# Patient Record
Sex: FEMALE | Race: WHITE | Employment: OTHER | URBAN - METROPOLITAN AREA
[De-identification: names, ages, dates, MRNs, and addresses within clinical notes are randomized per-mention and may not be internally consistent; named-entity substitution may affect disease eponyms.]

---

## 2017-01-12 ENCOUNTER — TELEPHONE (OUTPATIENT)
Dept: CARDIOLOGY CLINIC | Age: 82
End: 2017-01-12

## 2017-01-12 ENCOUNTER — OFFICE VISIT (OUTPATIENT)
Dept: CARDIOLOGY CLINIC | Age: 82
End: 2017-01-12

## 2017-01-12 VITALS
HEART RATE: 71 BPM | BODY MASS INDEX: 26.72 KG/M2 | SYSTOLIC BLOOD PRESSURE: 140 MMHG | DIASTOLIC BLOOD PRESSURE: 64 MMHG | OXYGEN SATURATION: 96 % | WEIGHT: 136.8 LBS

## 2017-01-12 DIAGNOSIS — Z79.01 CHRONIC ANTICOAGULATION: ICD-10-CM

## 2017-01-12 DIAGNOSIS — I44.7 LEFT BUNDLE BRANCH BLOCK: ICD-10-CM

## 2017-01-12 DIAGNOSIS — E03.4 HYPOTHYROIDISM DUE TO ACQUIRED ATROPHY OF THYROID: ICD-10-CM

## 2017-01-12 DIAGNOSIS — I49.5 TACHY-BRADY SYNDROME (HCC): ICD-10-CM

## 2017-01-12 DIAGNOSIS — I48.20 CHRONIC ATRIAL FIBRILLATION (HCC): Primary | ICD-10-CM

## 2017-01-12 DIAGNOSIS — R06.09 DOE (DYSPNEA ON EXERTION): ICD-10-CM

## 2017-01-12 RX ORDER — LEVOTHYROXINE SODIUM 25 UG/1
25 TABLET ORAL
COMMUNITY

## 2017-01-12 NOTE — TELEPHONE ENCOUNTER
Results explained to patient, she scheduled today with Dr. Aileen Brooks, will discuss scheduling with Dr. Hadley Sandhu at that time per patient's request.

## 2017-01-12 NOTE — PROGRESS NOTES
1. Have you been to the ER, urgent care clinic since your last visit? Hospitalized since your last visit? No    2. Have you seen or consulted any other health care providers outside of the [address] [place] [address] since your last visit? Include any pap smears or colon screening.  No

## 2017-01-12 NOTE — MR AVS SNAPSHOT
Visit Information Date & Time Provider Department Dept. Phone Encounter #  
 1/12/2017 10:00 AM Shar Andrews MD [facility] [address] [id number] Your Appointments 2/2/2017 12:40 PM  
DISCUSSION with Tavon Garza MD  
[facility] ([facility]) [place] [address] [address] [address] [phone] [address] [place] [address] [address] Upcoming Health Maintenance Date Due DTaP/Tdap/Td series (1 - Tdap) 10/22/1944 ZOSTER VACCINE AGE 60> 10/22/1983 GLAUCOMA SCREENING Q2Y 10/22/1988 OSTEOPOROSIS SCREENING (DEXA) 10/22/1988 Pneumococcal 65+ Low/Medium Risk (1 of 2 - PCV13) 10/22/1988 MEDICARE YEARLY EXAM 10/22/1988 INFLUENZA AGE 9 TO ADULT 8/1/2016 Allergies as of 1/12/2017  Review Complete On: 12/15/2016 By: Shar Andrews MD  
  
 Severity Noted Reaction Type Reactions Codeine  06/30/2013    Other (comments) Current Immunizations  Never Reviewed No immunizations on file. Not reviewed this visit You Were Diagnosed With   
  
 Codes Comments Irregular heartbeat    -  Primary ICD-10-CM: I49.9 ICD-9-CM: 427.9 Vitals BP Pulse Weight(growth percentile) SpO2 BMI OB Status 140/64 71 136 lb 12.8 oz (62.1 kg) 96% 26.72 kg/m2 Postmenopausal  
 Smoking Status Never Smoker Vitals History BMI and BSA Data Body Mass Index Body Surface Area  
 26.72 kg/m 2 1.62 m 2 Preferred Pharmacy Pharmacy Name Phone Tomas Guillen Do [facility] [address], [address] [facility] [phone] Your Updated Medication List  
  
   
This list is accurate as of: 1/12/17 11:51 AM.  Always use your most recent med list.  
  
  
  
  
 ALPHAGAN P 0.1 % ophthalmic solution Generic drug:  brimonidine Apply  to eye. amLODIPine 2.5 mg tablet Commonly known as:  Stacy Darting Take  by mouth.  
  
 calcium carbonate 500 mg calcium (1,250 mg) tablet Commonly known as:  OS-MARLON Take  by mouth. CENTRUM SILVER 0.4-300-250 mg-mcg-mcg Tab Generic drug:  multivit-min-FA-lycopen-lutein Take  by mouth. COSOPT 22.3-6.8 mg/mL ophthalmic solution Generic drug:  dorzolamide-timolol Apply  to eye.  
  
 levothyroxine 25 mcg tablet Commonly known as:  SYNTHROID Take 25 mcg by mouth Daily (before breakfast). losartan-hydroCHLOROthiazide 100-25 mg per tablet Commonly known as:  HYZAAR Take  by mouth.  
  
 metoprolol succinate 50 mg XL tablet Commonly known as:  TOPROL-XL Take  by mouth. PEPCID 20 mg tablet Generic drug:  famotidine Take  by mouth.  
  
 triamcinolone acetonide 0.1 % ointment Commonly known as:  KENALOG Triamcinolone Acetonide 0.1 % External Ointment  Quantity: 15   Started 7-Oct-2016 Active TYLENOL ARTHRITIS PAIN 650 mg CR tablet Generic drug:  acetaminophen Take  by mouth. Valentino Grew Walker Miscellaneous One walker--PT evaluate if wheels needed  Quantity: 1    [facility]  Started 26-Jan-2015 Active XALATAN 0.005 % ophthalmic solution Generic drug:  latanoprost  
Apply  to eye. * XARELTO 20 mg Tab tablet Generic drug:  rivaroxaban Take 20 mg by mouth daily. * rivaroxaban 15 mg Tab tablet Commonly known as:  Tennille Johnson Take 1 Tab by mouth daily. * Notice: This list has 2 medication(s) that are the same as other medications prescribed for you. Read the directions carefully, and ask your doctor or other care provider to review them with you. We Performed the Following AMB POC EKG ROUTINE W/ 12 LEADS, INTER & REP [31354 CPT(R)] Introducing [facility] & HEALTH SERVICES! [address] introduces [employer] patient portal. Now you can access parts of your medical record, email your doctor's office, and request medication refills online. 1. In your internet browser, go to [email or web address] 2. Click on the First Time User? Click Here link in the Sign In box. You will see the New Member Sign Up page. 3. Enter your [employer] Access Code exactly as it appears below. You will not need to use this code after youve completed the sign-up process. If you do not sign up before the expiration date, you must request a new code. · [employer] Access Code: [email or web address] Expires: 3/15/2017 10:07 AM 
 
4. Enter the last four digits of your Social Security Number (xxxx) and Date of Birth (mm/dd/yyyy) as indicated and click Submit. You will be taken to the next sign-up page. 5. Create a MobileSnackt ID. This will be your [employer] login ID and cannot be changed, so think of one that is secure and easy to remember. 6. Create a [employer] password. You can change your password at any time. 7. Enter your Password Reset Question and Answer. This can be used at a later time if you forget your password. 8. Enter your e-mail address. You will receive e-mail notification when new information is available in [address] 9. Click Sign Up. You can now view and download portions of your medical record. 10. Click the Download Summary menu link to download a portable copy of your medical information. If you have questions, please visit the Frequently Asked Questions section of the [employer] website. Remember, [employer] is NOT to be used for urgent needs. For medical emergencies, dial 911. Now available from your iPhone and Android! Please provide this summary of care documentation to your next provider. Your primary care clinician is listed as Samuel Sexton. If you have any questions after today's visit, please call [phone].

## 2017-01-12 NOTE — PROGRESS NOTES
\"       PATIENT NAME: Mirian Pulse         80 y.o.      10/22/1923              DATE:1/12/2017    REASON FOR VISIT:atrial fibrillation    HISTORY OF PRESENT ILLNESS:The patient's Holter monitor showed wide variations in heart rate ranging from 42 beats per minute to 182 beats per minute. The average heart rate was approximately 70 beats per minute. There were multiple pauses, the longest persisting for 3.3 seconds. The patient notices shortness of breath on exertion. Denies orthopnea and paroxysmal nocturnal dyspnea. Denies palpitation, syncope, presyncope. Was started on Xarelto last visit. Denies signs of bleeding. Has a history of hypothyroidism. Synthroid decreased to 0.025 mg daily by her PCP    PAST MEDICAL HISTORY:   Past Medical History:    Hypertension                                                  PAST SURGICAL HISTORY:   Past Surgical History:    HX HYSTERECTOMY                                                  SOCIAL HISTORY:  Social History    Marital status:              Spouse name:                       Years of education:                 Number of children:               Social History Main Topics    Smoking status: Never Smoker                                                                  ALLERGIES:    -- Codeine -- Other (comments)     CURRENT MEDICATIONS:   Current Outpatient Prescriptions:  levothyroxine (SYNTHROID) 25 mcg tablet, Take 25 mcg by mouth Daily (before breakfast). rivaroxaban (XARELTO) 15 mg tab tablet, Take 1 Tab by mouth daily. rivaroxaban (XARELTO) 20 mg tab tablet, Take 20 mg by mouth daily. brimonidine (ALPHAGAN P) 0.1 % ophthalmic solution, Apply  to eye.  calcium carbonate (OS-MARLON) 500 mg calcium (1,250 mg) tablet, Take  by mouth.   multivit-min-FA-lycopen-lutein (CENTRUM SILVER) 0.4-300-250 mg-mcg-mcg tab, Take  by mouth.   dorzolamide-timolol (COSOPT) 22.3-6.8 mg/mL ophthalmic solution, Apply  to eye.  losartan-hydroCHLOROthiazide (HYZAAR) 100-25 mg per tablet, Take  by mouth.  metoprolol succinate (TOPROL-XL) 50 mg XL tablet, Take  by mouth. famotidine (PEPCID) 20 mg tablet, Take  by mouth.  triamcinolone acetonide (KENALOG) 0.1 % ointment, Triamcinolone Acetonide 0.1 % External Ointment Quantity: 15 Started 7-Oct-2016Active  acetaminophen (TYLENOL ARTHRITIS PAIN) 650 mg CR tablet, Take  by mouth. Toño Noyola--PT evaluate if wheels needed Quantity: 1 ROSITA IRWIN;  Started [id number]  latanoprost (XALATAN) 0.005 % ophthalmic solution, Apply  to eye. amLODIPine (NORVASC) 2.5 mg tablet, Take  by mouth. No current facility-administered medications for this visit. REVIEW of SYSTEMS:History obtained from the patient  General ROS: negative for - weight gain or weight loss  Hematological and Lymphatic ROS: negative for - bleeding problems  Respiratory ROS: history of present illness  Cardiovascular ROS: history of present illness     PHYSICAL EXAMINATION:   /64  Pulse 71  Wt 62.1 kg (136 lb 12.8 oz)  SpO2 96%  BMI 26.72 kg/m2  BP Readings from Last 3 Encounters:  01/12/17 : 140/64  12/15/16 : 102/82  06/30/13 : 138/54    Pulse Readings from Last 3 Encounters:  01/12/17 : 71  12/15/16 : 87  06/30/13 : 64    Wt Readings from Last 3 Encounters:  01/12/17 : 62.1 kg (136 lb 12.8 oz)  12/15/16 : 64 kg (141 lb)    Neck: No jugular venous distention. Carotid upstrokes 2+ without bruits. Chest: Clear to auscultation: Heart: Irregular rhythm. No gallop. Abdomen: Nontender without masses. Extremities: No edema. Skin: Warm and dry. General: Well-developed white female in no apparent distress. EKG: Atrial fibrillation.  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 The patient is somewhat skeptical about this because of her age. I have suggested that she talk things over with an electrophysiologist. She likes this idea. We will not adjust rate control agents upward without a pacemaker in place. The diagnoses and plan were discussed with patient and friend. .  All questions answered. Plan of care agreed to by all concerned. Fransisca Mancia.  MD Manjeet       ,              \"

## 2017-01-19 ENCOUNTER — OFFICE VISIT (OUTPATIENT)
Dept: CARDIOLOGY CLINIC | Age: 82
End: 2017-01-19

## 2017-01-19 VITALS
SYSTOLIC BLOOD PRESSURE: 108 MMHG | OXYGEN SATURATION: 98 % | HEART RATE: 91 BPM | HEIGHT: 60 IN | DIASTOLIC BLOOD PRESSURE: 70 MMHG

## 2017-01-19 DIAGNOSIS — I49.5 TACHY-BRADY SYNDROME (HCC): Primary | ICD-10-CM

## 2017-01-19 DIAGNOSIS — I48.91 ATRIAL FIBRILLATION, UNSPECIFIED TYPE (HCC): ICD-10-CM

## 2017-01-19 DIAGNOSIS — I44.7 LEFT BUNDLE BRANCH BLOCK: ICD-10-CM

## 2017-01-19 NOTE — PROGRESS NOTES
1. Have you been to the ER, urgent care clinic since your last visit? Hospitalized since your last visit? No     2. Have you seen or consulted any other health care providers outside of the [facility] since your last visit? Include any pap smears or colon screening.  No

## 2017-01-19 NOTE — PROGRESS NOTES
History of Present Illness:   A 80 y.o. female referred by Dr. Gabe Bose for evaluation of possible pacemaker. She has tachybrady syndrome, symptomatic dyspnea, confirmed by Holter monitor. She is here to discuss options. Her symptoms started last summer. She was noted to have some thyroid problems and started on Synthroid. She is now on Xarelto. She has a history of hypertension. She had a fall after her refrigerator handle broke off a number of years ago and had a hip fracture that was conservatively treated. At this time, she gets around with a walker. She is accompanied by her daughter. She denies chest pain but again her major complaints are of occasional dyspnea and some edema. Impression:   Persistent atrial fibrillation now on Xarelto as well as Toprol 50 mg a day. History of hypertension. Thyroid disorder. Limited functional status getting around with a walker. She lives alone. Remote hip fracture with conservative treatment. I discussed risks, benefits and alternatives of implantation of a single chamber MRI compatible pacemaker. All questions answered. She would like to think about it. She will call back if she would like to proceed. I will plan to hold Xarelto 48 hours prior to procedure. I discussed small risk of anesthesia reactions, bleeding, pneumothorax, potential hemothorax, limitations and need for someone to be with her for one week post implant. Her daughter works in rehab. All questions answered. Thank you kindly for allowing me to participate in this patient's care. Past Medical History   Diagnosis Date    Cardiac echocardiogram 12/20/2016     EF 55-60%. No WMA. Mild RVE. RVSP 48 mmHg. Severe LAE (prev normal on 1/22/08). Mild DEMARCO. Mild MR.  Mild-mod TR.  Cardiac Holter monitor, abnormal 12/23/2016     Rhythm is atrial fibrillation w/significant HR variation between  bpm, avg 75 bpm.  Near complete BBB. Pauses (337), longest 3.3 secs.   No symptoms reported.  Hypertension        Current Outpatient Prescriptions   Medication Sig Dispense Refill    levothyroxine (SYNTHROID) 25 mcg tablet Take 25 mcg by mouth Daily (before breakfast).  rivaroxaban (XARELTO) 15 mg tab tablet Take 1 Tab by mouth daily. 30 Tab 3    rivaroxaban (XARELTO) 20 mg tab tablet Take 20 mg by mouth daily.  brimonidine (ALPHAGAN P) 0.1 % ophthalmic solution Apply  to eye.  amLODIPine (NORVASC) 2.5 mg tablet Take  by mouth.  calcium carbonate (OS-MARLON) 500 mg calcium (1,250 mg) tablet Take  by mouth.  multivit-min-FA-lycopen-lutein (CENTRUM SILVER) 0.4-300-250 mg-mcg-mcg tab Take  by mouth.  dorzolamide-timolol (COSOPT) 22.3-6.8 mg/mL ophthalmic solution Apply  to eye.  losartan-hydroCHLOROthiazide (HYZAAR) 100-25 mg per tablet Take  by mouth.  metoprolol succinate (TOPROL-XL) 50 mg XL tablet Take  by mouth.  famotidine (PEPCID) 20 mg tablet Take  by mouth.  triamcinolone acetonide (KENALOG) 0.1 % ointment Triamcinolone Acetonide 0.1 % External Ointment   Quantity: 15     Started 7-Oct-2016  Active      acetaminophen (TYLENOL ARTHRITIS PAIN) 650 mg CR tablet Take  by mouth.  Walker misc Walker Miscellaneous  One walker--PT evaluate if wheels needed   Quantity: 1       ROSITA IRWIN;  Started 26-Jan-2015  Active      latanoprost (XALATAN) 0.005 % ophthalmic solution Apply  to eye. Social History   reports that she has never smoked. She does not have any smokeless tobacco history on file. has no alcohol history on file. Family History  family history is not on file. Review of Systems  Except as stated above include:  Constitutional: Negative for fever, chills and malaise/fatigue. HEENT: No congestion or recent URI. Gastrointestinal: No nausea, vomiting, abdominal pain, bloody stools. Pulmonary:  Negative except as stated above. Cardiac:  Negative except as stated above.   Musculoskeletal: Negative except as stated above. Neurological:  No localized symptoms. Skin:  Negative except as stated above. Psych:  No mood swings. Endocrine:  No heat/cold intolerance. PHYSICAL EXAM  BP Readings from Last 3 Encounters:   01/19/17 108/70   01/12/17 140/64   12/15/16 102/82     Pulse Readings from Last 3 Encounters:   01/19/17 91   01/12/17 71   12/15/16 87     Wt Readings from Last 3 Encounters:   01/12/17 62.1 kg (136 lb 12.8 oz)   12/15/16 64 kg (141 lb)     General:   Well developed, well groomed. Head/Neck:   No jugular venous distention     No carotid bruits. No evidence of xanthelasma. Heart:    Irregularly irregular. Extremities:   Intact peripheral pulses. Mild edema. Neurological:   Alert and oriented to person, place, time. No focal neurological deficit visually.

## 2017-01-19 NOTE — MR AVS SNAPSHOT
Visit Information Date & Time Provider Department Dept. Phone Encounter #  
 1/19/2017  4:00 PM Mo Clark MD [facility] [address] [id number] Follow-up Instructions Follow-up and Disposition History Your Appointments 2/2/2017 12:40 PM  
DISCUSSION with Mo Clark MD  
[facility] ([facility]) [facility] [address] [address] [address] [phone] [address] [place] [address] [address] Upcoming Health Maintenance Date Due DTaP/Tdap/Td series (1 - Tdap) 10/22/1944 ZOSTER VACCINE AGE 60> 10/22/1983 GLAUCOMA SCREENING Q2Y 10/22/1988 OSTEOPOROSIS SCREENING (DEXA) 10/22/1988 Pneumococcal 65+ Low/Medium Risk (1 of 2 - PCV13) 10/22/1988 MEDICARE YEARLY EXAM 10/22/1988 INFLUENZA AGE 9 TO ADULT 8/1/2016 Allergies as of 1/19/2017  Review Complete On: 1/19/2017 By: Mo Clark MD  
  
 Severity Noted Reaction Type Reactions Codeine  06/30/2013    Other (comments) Current Immunizations  Never Reviewed No immunizations on file. Not reviewed this visit You Were Diagnosed With   
  
 Codes Comments Tachy-tremaine syndrome ([facility]    -  Primary ICD-10-CM: I49.5 ICD-9-CM: 427.81 Left bundle branch block     ICD-10-CM: I44.7 ICD-9-CM: 426. 3 Atrial fibrillation, unspecified type ([facility]     ICD-10-CM: I48.91 
ICD-9-CM: 427.31 Vitals BP Pulse Height(growth percentile) SpO2 OB Status Smoking Status 108/70 91 5' (1.524 m) 98% Postmenopausal Never Smoker Preferred Pharmacy Pharmacy Name Phone Tomas Williamson [address], [address] [phone] Your Updated Medication List  
  
   
This list is accurate as of: 1/19/17  4:33 PM.  Always use your most recent med list.  
  
  
  
  
 ALPHAGAN P 0.1 % ophthalmic solution Generic drug:  brimonidine Apply  to eye. amLODIPine 2.5 mg tablet Commonly known as:  Milan Chafe Take  by mouth.  
  
 calcium carbonate 500 mg calcium (1,250 mg) tablet Commonly known as:  OS-MARLON Take  by mouth. CENTRUM SILVER 0.4-300-250 mg-mcg-mcg Tab Generic drug:  multivit-min-FA-lycopen-lutein Take  by mouth. COSOPT 22.3-6.8 mg/mL ophthalmic solution Generic drug:  dorzolamide-timolol Apply  to eye.  
  
 levothyroxine 25 mcg tablet Commonly known as:  SYNTHROID Take 25 mcg by mouth Daily (before breakfast). losartan-hydroCHLOROthiazide 100-25 mg per tablet Commonly known as:  HYZAAR Take  by mouth.  
  
 metoprolol succinate 50 mg XL tablet Commonly known as:  TOPROL-XL Take  by mouth. PEPCID 20 mg tablet Generic drug:  famotidine Take  by mouth.  
  
 triamcinolone acetonide 0.1 % ointment Commonly known as:  KENALOG Triamcinolone Acetonide 0.1 % External Ointment  Quantity: 15   Started 7-Oct-2016 Active TYLENOL ARTHRITIS PAIN 650 mg CR tablet Generic drug:  acetaminophen Take  by mouth. [place] Miscellaneous One walker--PT evaluate if wheels needed  Quantity: 1    Gladys BROOKE;  Started 26-Jan-2015 Active XALATAN 0.005 % ophthalmic solution Generic drug:  latanoprost  
Apply  to eye. * XARELTO 20 mg Tab tablet Generic drug:  rivaroxaban Take 20 mg by mouth daily. * rivaroxaban 15 mg Tab tablet Commonly known as:  Chayito Sapp Take 1 Tab by mouth daily. * Notice: This list has 2 medication(s) that are the same as other medications prescribed for you. Read the directions carefully, and ask your doctor or other care provider to review them with you. Introducing [facility] & HEALTH SERVICES! Fletcher Arcos introduces [employer] patient portal. Now you can access parts of your medical record, email your doctor's office, and request medication refills online.    
 
1. In your internet browser, go to [email or web address] 2. Click on the First Time User? Click Here link in the Sign In box. You will see the New Member Sign Up page. 3. Enter your In[employer] Access Code exactly as it appears below. You will not need to use this code after youve completed the sign-up process. If you do not sign up before the expiration date, you must request a new code. · In[employer] Access Code: [email or web address] Expires: 3/15/2017 10:07 AM 
 
4. Enter the last four digits of your Social Security Number (xxxx) and Date of Birth (mm/dd/yyyy) as indicated and click Submit. You will be taken to the next sign-up page. 5. Create a Root Oranget ID. This will be your In[employer] login ID and cannot be changed, so think of one that is secure and easy to remember. 6. Create a In[employer]ly password. You can change your password at any time. 7. Enter your Password Reset Question and Answer. This can be used at a later time if you forget your password. 8. Enter your e-mail address. You will receive e-mail notification when new information is available in [address] 9. Click Sign Up. You can now view and download portions of your medical record. 10. Click the Download Summary menu link to download a portable copy of your medical information. If you have questions, please visit the Frequently Asked Questions section of the [employer] website. Remember, [employer] is NOT to be used for urgent needs. For medical emergencies, dial 911. Now available from your iPhone and Android! Please provide this summary of care documentation to your next provider. Your primary care clinician is listed as Reina Kaiser. If you have any questions after today's visit, please call [phone].

## 2017-08-15 ENCOUNTER — HOSPITAL ENCOUNTER (OUTPATIENT)
Dept: MAMMOGRAPHY | Age: 82
Discharge: HOME | End: 2017-08-15
Attending: FAMILY MEDICINE
Payer: MEDICARE

## 2017-08-15 DIAGNOSIS — Z12.31 VISIT FOR SCREENING MAMMOGRAM: ICD-10-CM

## 2017-08-15 PROCEDURE — 77063 BREAST TOMOSYNTHESIS BI: CPT

## 2018-08-23 ENCOUNTER — HOSPITAL ENCOUNTER (OUTPATIENT)
Dept: MAMMOGRAPHY | Age: 83
Discharge: HOME | End: 2018-08-23
Attending: FAMILY MEDICINE
Payer: MEDICARE

## 2018-08-23 DIAGNOSIS — Z12.31 VISIT FOR SCREENING MAMMOGRAM: ICD-10-CM

## 2018-08-23 PROCEDURE — 77063 BREAST TOMOSYNTHESIS BI: CPT

## 2019-09-18 ENCOUNTER — HOSPITAL ENCOUNTER (OUTPATIENT)
Dept: MAMMOGRAPHY | Age: 84
Discharge: HOME | End: 2019-09-18
Attending: FAMILY MEDICINE
Payer: MEDICARE

## 2019-09-18 DIAGNOSIS — Z12.31 VISIT FOR SCREENING MAMMOGRAM: ICD-10-CM

## 2019-09-18 PROCEDURE — 77063 BREAST TOMOSYNTHESIS BI: CPT
